# Patient Record
Sex: MALE | Race: ASIAN | Employment: FULL TIME | URBAN - METROPOLITAN AREA
[De-identification: names, ages, dates, MRNs, and addresses within clinical notes are randomized per-mention and may not be internally consistent; named-entity substitution may affect disease eponyms.]

---

## 2017-10-29 ENCOUNTER — HOSPITAL ENCOUNTER (EMERGENCY)
Facility: HOSPITAL | Age: 37
Discharge: HOME | End: 2017-10-29
Attending: EMERGENCY MEDICINE
Payer: COMMERCIAL

## 2017-10-29 VITALS
TEMPERATURE: 97 F | BODY MASS INDEX: 27.6 KG/M2 | HEART RATE: 64 BPM | HEIGHT: 62 IN | SYSTOLIC BLOOD PRESSURE: 120 MMHG | RESPIRATION RATE: 16 BRPM | OXYGEN SATURATION: 99 % | DIASTOLIC BLOOD PRESSURE: 80 MMHG | WEIGHT: 150 LBS

## 2017-10-29 DIAGNOSIS — G89.29 CHRONIC RIGHT-SIDED LOW BACK PAIN WITHOUT SCIATICA: Primary | ICD-10-CM

## 2017-10-29 DIAGNOSIS — M54.50 CHRONIC RIGHT-SIDED LOW BACK PAIN WITHOUT SCIATICA: Primary | ICD-10-CM

## 2017-10-29 PROCEDURE — 81001 URINALYSIS AUTO W/SCOPE: CPT | Performed by: EMERGENCY MEDICINE

## 2017-10-29 PROCEDURE — 81001 URINALYSIS AUTO W/SCOPE: CPT

## 2017-10-29 PROCEDURE — 99283 EMERGENCY DEPT VISIT LOW MDM: CPT

## 2017-10-29 RX ORDER — NAPROXEN 375 MG/1
375 TABLET ORAL 2 TIMES DAILY WITH MEALS
COMMUNITY

## 2017-10-29 RX ORDER — CARISOPRODOL 350 MG/1
350 TABLET ORAL 3 TIMES DAILY PRN
Qty: 20 TABLET | Refills: 0 | Status: SHIPPED | OUTPATIENT
Start: 2017-10-29

## 2017-10-29 RX ORDER — OMEPRAZOLE 20 MG/1
20 CAPSULE, DELAYED RELEASE ORAL
COMMUNITY

## 2017-10-29 RX ORDER — CHOLECALCIFEROL (VITAMIN D3) 1250 MCG
CAPSULE ORAL
COMMUNITY

## 2017-10-29 RX ORDER — LIDOCAINE 50 MG/G
1 PATCH TOPICAL ONCE
Status: DISCONTINUED | OUTPATIENT
Start: 2017-10-29 | End: 2017-10-29

## 2017-10-29 RX ORDER — METHYLPREDNISOLONE 4 MG/1
TABLET ORAL
Qty: 1 PACKAGE | Refills: 0 | Status: SHIPPED | OUTPATIENT
Start: 2017-10-29 | End: 2017-11-03

## 2017-10-29 RX ORDER — ACETAMINOPHEN AND CODEINE PHOSPHATE 300; 30 MG/1; MG/1
1 TABLET ORAL EVERY 4 HOURS PRN
COMMUNITY

## 2017-10-29 RX ORDER — UBIDECARENONE 75 MG
CAPSULE ORAL
COMMUNITY

## 2017-10-29 NOTE — ED PROVIDER NOTES
Patient Seen in: [facility] Emergency Department    History   Patient presents with:  Back Pain (musculoskeletal)    Stated Complaint: right sided low back / right leg pain - denies trauma    HPI    58-year-old male presents to the emerge from with Resp: 20 [10/29/17 1403]  Temp: (!) 96.8 °F (36 °C) [10/29/17 1403]  Temp src: n/a  SpO2: 100 % [10/29/17 1403]  O2 Device: None (Room air) [10/29/17 1426]    Current:/80   Pulse 64   Temp (!) 96.8 °F (36 °C)   Resp 16   Ht 157.5 cm (5' 2\")   Wt 68 As I was doing my history and exam I went through different things such as core strengthening and discussing posture and doing preventive measures and patient at one point or another states that he has done all these things.   He states that he does not wan

## 2017-10-29 NOTE — ED INITIAL ASSESSMENT (HPI)
Pt here with low back pain for many months, has not seen and md, has been going to chiropractor without results

## (undated) NOTE — ED AVS SNAPSHOT
Ivanna Hamptonfrances   MRN: [id number]    Department:  [facility] Emergency Department   Date of Visit:  10/29/2017           Disclosure     Insurance plans vary and the physician(s) referred by the ER may not be covered by your plan.  Please contact your If you have been prescribed any medication(s), please fill your prescription right away and begin taking the medication(s) as directed    If the emergency physician has read X-rays, these will be re-interpreted by a radiologist.  If there is a significant

## (undated) NOTE — ED AVS SNAPSHOT
[facility] Emergency Department  [place] [address]  Phone:  [phone]  Fax:  [phone]          Sherie Begum   MRN: [id number]    Department:  [facility] Emergency Department   Date of Visit:  10/29/2017 CARE PHYSICIAN AT ONCE OR RETURN IMMEDIATELY TO THE EMERGENCY DEPARTMENT.     If you have been prescribed any medication(s), please fill your prescription right away and begin taking the medication(s) as directed    If the emergency physician has read X-ray